# Patient Record
Sex: MALE | Race: BLACK OR AFRICAN AMERICAN
[De-identification: names, ages, dates, MRNs, and addresses within clinical notes are randomized per-mention and may not be internally consistent; named-entity substitution may affect disease eponyms.]

---

## 2019-08-03 ENCOUNTER — HOSPITAL ENCOUNTER (INPATIENT)
Dept: HOSPITAL 35 - ER | Age: 44
LOS: 5 days | Discharge: NURSING, REHAB OR LONG-TERM CARE | DRG: 871 | End: 2019-08-08
Attending: INTERNAL MEDICINE | Admitting: INTERNAL MEDICINE
Payer: COMMERCIAL

## 2019-08-03 VITALS — HEIGHT: 67.99 IN | BODY MASS INDEX: 26.19 KG/M2 | WEIGHT: 172.8 LBS

## 2019-08-03 VITALS — DIASTOLIC BLOOD PRESSURE: 89 MMHG | SYSTOLIC BLOOD PRESSURE: 134 MMHG

## 2019-08-03 VITALS — DIASTOLIC BLOOD PRESSURE: 91 MMHG | SYSTOLIC BLOOD PRESSURE: 132 MMHG

## 2019-08-03 VITALS — SYSTOLIC BLOOD PRESSURE: 150 MMHG | DIASTOLIC BLOOD PRESSURE: 74 MMHG

## 2019-08-03 VITALS — SYSTOLIC BLOOD PRESSURE: 138 MMHG | DIASTOLIC BLOOD PRESSURE: 97 MMHG

## 2019-08-03 DIAGNOSIS — G93.40: ICD-10-CM

## 2019-08-03 DIAGNOSIS — R73.9: ICD-10-CM

## 2019-08-03 DIAGNOSIS — K94.23: ICD-10-CM

## 2019-08-03 DIAGNOSIS — R40.3: ICD-10-CM

## 2019-08-03 DIAGNOSIS — D50.9: ICD-10-CM

## 2019-08-03 DIAGNOSIS — R25.2: ICD-10-CM

## 2019-08-03 DIAGNOSIS — J15.1: ICD-10-CM

## 2019-08-03 DIAGNOSIS — E87.6: ICD-10-CM

## 2019-08-03 DIAGNOSIS — A41.9: Primary | ICD-10-CM

## 2019-08-03 DIAGNOSIS — E43: ICD-10-CM

## 2019-08-03 DIAGNOSIS — G40.909: ICD-10-CM

## 2019-08-03 DIAGNOSIS — Z66: ICD-10-CM

## 2019-08-03 DIAGNOSIS — I10: ICD-10-CM

## 2019-08-03 DIAGNOSIS — Z86.711: ICD-10-CM

## 2019-08-03 DIAGNOSIS — Z99.11: ICD-10-CM

## 2019-08-03 DIAGNOSIS — N12: ICD-10-CM

## 2019-08-03 DIAGNOSIS — G03.9: ICD-10-CM

## 2019-08-03 DIAGNOSIS — M86.8X8: ICD-10-CM

## 2019-08-03 DIAGNOSIS — J96.11: ICD-10-CM

## 2019-08-03 DIAGNOSIS — R74.0: ICD-10-CM

## 2019-08-03 LAB
ALBUMIN SERPL-MCNC: 3.2 G/DL (ref 3.4–5)
ALT SERPL-CCNC: 158 U/L (ref 30–65)
ANION GAP SERPL CALC-SCNC: 9 MMOL/L (ref 7–16)
AST SERPL-CCNC: 40 U/L (ref 15–37)
BASOPHILS NFR BLD AUTO: 0.6 % (ref 0–2)
BILIRUB SERPL-MCNC: 0.8 MG/DL
BILIRUB UR-MCNC: NEGATIVE MG/DL
BUN SERPL-MCNC: 19 MG/DL (ref 7–18)
CALCIUM SERPL-MCNC: 10.4 MG/DL (ref 8.5–10.1)
CHLORIDE SERPL-SCNC: 101 MMOL/L (ref 98–107)
CO2 SERPL-SCNC: 31 MMOL/L (ref 21–32)
COLOR UR: YELLOW
CREAT SERPL-MCNC: 0.8 MG/DL (ref 0.7–1.3)
EOSINOPHIL NFR BLD: 1.4 % (ref 0–3)
ERYTHROCYTE [DISTWIDTH] IN BLOOD BY AUTOMATED COUNT: 17.3 % (ref 10.5–14.5)
GLUCOSE SERPL-MCNC: 115 MG/DL (ref 74–106)
GRANULOCYTES NFR BLD MANUAL: 73.1 % (ref 36–66)
HCT VFR BLD CALC: 37.3 % (ref 42–52)
HGB BLD-MCNC: 12.2 GM/DL (ref 14–18)
KETONES UR STRIP-MCNC: NEGATIVE MG/DL
LYMPHOCYTES NFR BLD AUTO: 14.7 % (ref 24–44)
MCH RBC QN AUTO: 25 PG (ref 26–34)
MCHC RBC AUTO-ENTMCNC: 32.7 G/DL (ref 28–37)
MCV RBC: 76.5 FL (ref 80–100)
MONOCYTES NFR BLD: 10.2 % (ref 1–8)
NEUTROPHILS # BLD: 12 THOU/UL (ref 1.4–8.2)
PLATELET # BLD: 356 THOU/UL (ref 150–400)
POTASSIUM SERPL-SCNC: 3.5 MMOL/L (ref 3.5–5.1)
PROT SERPL-MCNC: 8.8 G/DL (ref 6.4–8.2)
RBC # BLD AUTO: 4.88 MIL/UL (ref 4.5–6)
RBC # UR STRIP: NEGATIVE /UL
SODIUM SERPL-SCNC: 141 MMOL/L (ref 136–145)
SP GR UR STRIP: 1.02 (ref 1–1.03)
URINE CLARITY: CLEAR
URINE GLUCOSE-RANDOM*: NEGATIVE
URINE LEUKOCYTES-REFLEX: NEGATIVE
URINE NITRITE-REFLEX: NEGATIVE
URINE PROTEIN (DIPSTICK): NEGATIVE
UROBILINOGEN UR STRIP-ACNC: 0.2 E.U./DL (ref 0.2–1)
WBC # BLD AUTO: 16.4 THOU/UL (ref 4–11)

## 2019-08-03 PROCEDURE — 10879: CPT

## 2019-08-03 PROCEDURE — 27000 TENOTOMY ADDUCTOR HIP PERQ: CPT

## 2019-08-04 VITALS — DIASTOLIC BLOOD PRESSURE: 66 MMHG | SYSTOLIC BLOOD PRESSURE: 101 MMHG

## 2019-08-04 VITALS — SYSTOLIC BLOOD PRESSURE: 134 MMHG | DIASTOLIC BLOOD PRESSURE: 90 MMHG

## 2019-08-04 VITALS — SYSTOLIC BLOOD PRESSURE: 136 MMHG | DIASTOLIC BLOOD PRESSURE: 71 MMHG

## 2019-08-04 VITALS — DIASTOLIC BLOOD PRESSURE: 79 MMHG | SYSTOLIC BLOOD PRESSURE: 117 MMHG

## 2019-08-04 VITALS — SYSTOLIC BLOOD PRESSURE: 109 MMHG | DIASTOLIC BLOOD PRESSURE: 74 MMHG

## 2019-08-04 NOTE — NUR
pt opens his eyes, but pt no verbal, and pt does not follw commands, pt is on
o2 35% at trach, pt still have lot of sputum and needs suction, pt has low
fever today, temp 99.2-99.9 F (ax), pt has started IV abt and iv fluid, pt's
vs and o2sat are stable at this time.

## 2019-08-04 NOTE — NUR
Patient making slow progress towards outcome goals. Oxygenation optimal with
35% O2, sats 100%
Copious amount thick creamy/white to blood tinged secretions per
trach. Abdomen distended, hypoactive bowel sounds. Patient making frequent
gagging motion, no vomiting. Zofran given.

## 2019-08-05 VITALS — SYSTOLIC BLOOD PRESSURE: 125 MMHG | DIASTOLIC BLOOD PRESSURE: 87 MMHG

## 2019-08-05 VITALS — SYSTOLIC BLOOD PRESSURE: 135 MMHG | DIASTOLIC BLOOD PRESSURE: 93 MMHG

## 2019-08-05 VITALS — DIASTOLIC BLOOD PRESSURE: 96 MMHG | SYSTOLIC BLOOD PRESSURE: 134 MMHG

## 2019-08-05 VITALS — DIASTOLIC BLOOD PRESSURE: 87 MMHG | SYSTOLIC BLOOD PRESSURE: 128 MMHG

## 2019-08-05 VITALS — SYSTOLIC BLOOD PRESSURE: 121 MMHG | DIASTOLIC BLOOD PRESSURE: 91 MMHG

## 2019-08-05 VITALS — DIASTOLIC BLOOD PRESSURE: 76 MMHG | SYSTOLIC BLOOD PRESSURE: 102 MMHG

## 2019-08-05 LAB
ANION GAP SERPL CALC-SCNC: 8 MMOL/L (ref 7–16)
ANISOCYTOSIS BLD QL SMEAR: (no result)
BILIRUB UR-MCNC: NEGATIVE MG/DL
BUN SERPL-MCNC: 10 MG/DL (ref 7–18)
CALCIUM SERPL-MCNC: 9.7 MG/DL (ref 8.5–10.1)
CHLORIDE SERPL-SCNC: 108 MMOL/L (ref 98–107)
CO2 SERPL-SCNC: 27 MMOL/L (ref 21–32)
COLOR UR: YELLOW
CREAT SERPL-MCNC: 0.7 MG/DL (ref 0.7–1.3)
ERYTHROCYTE [DISTWIDTH] IN BLOOD BY AUTOMATED COUNT: 17.1 % (ref 10.5–14.5)
GLUCOSE SERPL-MCNC: 135 MG/DL (ref 74–106)
GRANULOCYTES NFR BLD MANUAL: 81 % (ref 36–66)
HCT VFR BLD CALC: 33 % (ref 42–52)
HGB BLD-MCNC: 10.6 GM/DL (ref 14–18)
KETONES UR STRIP-MCNC: NEGATIVE MG/DL
LYMPHOCYTES NFR BLD AUTO: 9 % (ref 24–44)
MCH RBC QN AUTO: 25 PG (ref 26–34)
MCHC RBC AUTO-ENTMCNC: 32 G/DL (ref 28–37)
MCV RBC: 77.9 FL (ref 80–100)
MONOCYTES NFR BLD: 6 % (ref 1–8)
NEUTROPHILS # BLD: 17.7 THOU/UL (ref 1.4–8.2)
NEUTS BAND NFR BLD: 4 % (ref 0–8)
NUCLEATED RBCS: 1 /100WBC
PLATELET # BLD EST: NORMAL 10*3/UL
PLATELET # BLD: 306 THOU/UL (ref 150–400)
POIKILOCYTOSIS BLD QL SMEAR: (no result)
POTASSIUM SERPL-SCNC: 4.2 MMOL/L (ref 3.5–5.1)
RBC # BLD AUTO: 4.24 MIL/UL (ref 4.5–6)
RBC # UR STRIP: NEGATIVE /UL
SODIUM SERPL-SCNC: 143 MMOL/L (ref 136–145)
SP GR UR STRIP: 1.01 (ref 1–1.03)
URINE CLARITY: CLEAR
URINE GLUCOSE-RANDOM*: NEGATIVE
URINE LEUKOCYTES-REFLEX: NEGATIVE
URINE NITRITE-REFLEX: NEGATIVE
URINE PROTEIN (DIPSTICK): NEGATIVE
UROBILINOGEN UR STRIP-ACNC: 0.2 E.U./DL (ref 0.2–1)
WBC # BLD AUTO: 20.8 THOU/UL (ref 4–11)

## 2019-08-05 NOTE — NUR
INITIAL ASSESSMENT:
Received consult due to pt being admitted from [facility]. MEJIA reviewed chart and
spoke with nursing. Pt was admitted from [facility] due to sepsis. Pt with hx
of seizure disorder and chronic respiratory failure/chronic vegetative state
due to viral meningitis. MEJIA spoke with pt's wife via phone. Introduced role of
MEJIA. Pt has been at [facility] for several weeks. Pt was at [facility] in
December of 2018, then he went to [facility], and then to [facility]. Pt's
wife is agreeable with plan for pt to return to [facility] when medically stable.
[facility] liaison was onsite today and confirms they are able to accept pt back
when he is medically stable. MEJIA is following to assist as needed with
discharge planning.

## 2019-08-05 NOTE — NUR
Nutrition: Recommend continuing Jevity 1.5 at current ordered rate of 50
ml/hr. Based on energy needs estimated ~1960 kcals (25 kcals/kg), recommend
goal rate of 50-55 ml/hr if running 24 hrs/day. Suggest 150 ml water flushes
q 4 hrs to meet minimum hydration needs. Elena Anne(Attending)

## 2019-08-06 VITALS — SYSTOLIC BLOOD PRESSURE: 141 MMHG | DIASTOLIC BLOOD PRESSURE: 84 MMHG

## 2019-08-06 VITALS — SYSTOLIC BLOOD PRESSURE: 132 MMHG | DIASTOLIC BLOOD PRESSURE: 92 MMHG

## 2019-08-06 VITALS — SYSTOLIC BLOOD PRESSURE: 119 MMHG | DIASTOLIC BLOOD PRESSURE: 84 MMHG

## 2019-08-06 VITALS — SYSTOLIC BLOOD PRESSURE: 135 MMHG | DIASTOLIC BLOOD PRESSURE: 98 MMHG

## 2019-08-06 LAB
ANION GAP SERPL CALC-SCNC: 11 MMOL/L (ref 7–16)
BUN SERPL-MCNC: 7 MG/DL (ref 7–18)
CALCIUM SERPL-MCNC: 9.4 MG/DL (ref 8.5–10.1)
CHLORIDE SERPL-SCNC: 107 MMOL/L (ref 98–107)
CO2 SERPL-SCNC: 25 MMOL/L (ref 21–32)
CREAT SERPL-MCNC: 0.8 MG/DL (ref 0.7–1.3)
ERYTHROCYTE [DISTWIDTH] IN BLOOD BY AUTOMATED COUNT: 17.1 % (ref 10.5–14.5)
GLUCOSE SERPL-MCNC: 138 MG/DL (ref 74–106)
HCT VFR BLD CALC: 33 % (ref 42–52)
HGB BLD-MCNC: 10.6 GM/DL (ref 14–18)
MCH RBC QN AUTO: 25 PG (ref 26–34)
MCHC RBC AUTO-ENTMCNC: 32.1 G/DL (ref 28–37)
MCV RBC: 77.8 FL (ref 80–100)
PLATELET # BLD: 308 THOU/UL (ref 150–400)
POTASSIUM SERPL-SCNC: 3.7 MMOL/L (ref 3.5–5.1)
RBC # BLD AUTO: 4.24 MIL/UL (ref 4.5–6)
SODIUM SERPL-SCNC: 143 MMOL/L (ref 136–145)
WBC # BLD AUTO: 14.3 THOU/UL (ref 4–11)

## 2019-08-06 PROCEDURE — 02HV33Z INSERTION OF INFUSION DEVICE INTO SUPERIOR VENA CAVA, PERCUTANEOUS APPROACH: ICD-10-PCS | Performed by: INTERNAL MEDICINE

## 2019-08-06 NOTE — NUR
ASSUMED PT CARE AROUND 1900. NONVERBAL. DOES NOT FOLLOW COMMANDS. NO APPARENT
PAIN. SUCTIONED LARGE AMOUNTS OF SECRETIONS FROM TRACH AND MOUTH. ORAL CARE
PROVIDED. PT SLEPT MOST OF THE NIGHT. Q2H TURN. EXTREMITIES ELEVATED ON
PILLOWS. TF VIA PEG TUBE - MINIMAL RESIDUALS. COMPLETE BED BATH GIVEN. FALL
PRECAUTIONS IN PLACE. PROGRESSING VERY SLOWLY TOWARD POC GOALS. WILL CONTINUE
TO MONITOR FURTHER.

## 2019-08-06 NOTE — NUR
SW reviewed chart and spoke with nursing and attending physician. Awaiting
cultures at this time. Discharge planner faxed updates to [facility] for review.
Plan is for pt to return to [facility] when medically stable. MEJIA is following
to assist as needed with discharge planning.

## 2019-08-06 NOTE — NUR
NOTICED THAT PEGTUBE IS PARIALY PROTRUDING FROM SITE. IT LEAKS TUBEFEEDING.
HOLD TUBEFEED FOR NOW. IR CONSULTED FOR REPLACEMNT IN THE MORNING. ALSO,
RECIEVED ORDER TO DECREACE SECRETIONS. HE HAS COPIOUS AMOUNTS OF SECRETIONS.
SPUTUM WHITE/CLEAR AND IT IS POOLING IN HIS MOUTH AND DRAINING AROUND SHOULDER
AND BACK

## 2019-08-06 NOTE — NUR
DISCHARGE PLANNING.  PATIENT ADMITTED FROM [facility] UNIT.  PLAN IS FOR
PATIENT TO RETURN TO [facility] UNIT ONCE MEDICALLY READY. ANA COSTELLO
INTAKE LIAISON AWARE.  CLINICAL INFORMATION FAXED TO [facility].  FOLLOWING TO
ASSIST WITH DISCHARGE NEEDS.

## 2019-08-06 NOTE — NUR
CONSULTED TO PLACE A PICC FOR A PATIENT NEEDING ADDITIONAL ACCESS FOR IV
ANTIBIOTICS. ORDER AND CONSENT NOTED. THE PATIENT IS NOT RESPONSIVE TO
TEACHING. THE RUE BASILIC WAS WIDLEY PATENT. A #5F DOUBLE LUMEN POWER PICC WAS
PLACED PER HOSPITAL POLICY AFTER A BEDSIDE TIMEOUT WAS COMPLETED. PICC WAS
TRIMMED TO 41CM AND ADVANCED WITHOUT DIFFICULTY. A STAT CHEST XRAY WAS ORDERED
FOR CONFIRMATION

## 2019-08-07 VITALS — SYSTOLIC BLOOD PRESSURE: 119 MMHG | DIASTOLIC BLOOD PRESSURE: 83 MMHG

## 2019-08-07 VITALS — DIASTOLIC BLOOD PRESSURE: 91 MMHG | SYSTOLIC BLOOD PRESSURE: 125 MMHG

## 2019-08-07 VITALS — SYSTOLIC BLOOD PRESSURE: 129 MMHG | DIASTOLIC BLOOD PRESSURE: 94 MMHG

## 2019-08-07 VITALS — DIASTOLIC BLOOD PRESSURE: 85 MMHG | SYSTOLIC BLOOD PRESSURE: 126 MMHG

## 2019-08-07 LAB
ALBUMIN SERPL-MCNC: 2.5 G/DL (ref 3.4–5)
ALT SERPL-CCNC: 96 U/L (ref 30–65)
ANION GAP SERPL CALC-SCNC: 8 MMOL/L (ref 7–16)
AST SERPL-CCNC: 25 U/L (ref 15–37)
BASOPHILS NFR BLD AUTO: 0.2 % (ref 0–2)
BE(VIVO): 2.2 MMOL/L
BILIRUB SERPL-MCNC: 0.5 MG/DL
BUN SERPL-MCNC: 5 MG/DL (ref 7–18)
CALCIUM SERPL-MCNC: 9.5 MG/DL (ref 8.5–10.1)
CHLORIDE SERPL-SCNC: 108 MMOL/L (ref 98–107)
CO2 SERPL-SCNC: 29 MMOL/L (ref 21–32)
CREAT SERPL-MCNC: 0.8 MG/DL (ref 0.7–1.3)
EOSINOPHIL NFR BLD: 2.5 % (ref 0–3)
ERYTHROCYTE [DISTWIDTH] IN BLOOD BY AUTOMATED COUNT: 17 % (ref 10.5–14.5)
GLUCOSE SERPL-MCNC: 94 MG/DL (ref 74–106)
GRANULOCYTES NFR BLD MANUAL: 76 % (ref 36–66)
HCO3 BLD-SCNC: 26.3 MMOL/L (ref 22–26)
HCT VFR BLD CALC: 29.6 % (ref 42–52)
HGB BLD-MCNC: 9.6 GM/DL (ref 14–18)
LYMPHOCYTES NFR BLD AUTO: 12.7 % (ref 24–44)
MCH RBC QN AUTO: 24.8 PG (ref 26–34)
MCHC RBC AUTO-ENTMCNC: 32.4 G/DL (ref 28–37)
MCV RBC: 76.6 FL (ref 80–100)
MONOCYTES NFR BLD: 8.6 % (ref 1–8)
NEUTROPHILS # BLD: 9.1 THOU/UL (ref 1.4–8.2)
PCO2 BLD: 38.7 MMHG (ref 35–45)
PLATELET # BLD: 325 THOU/UL (ref 150–400)
PO2 BLD: 197.4 MMHG (ref 80–100)
POTASSIUM SERPL-SCNC: 2.9 MMOL/L (ref 3.5–5.1)
PROT SERPL-MCNC: 6.9 G/DL (ref 6.4–8.2)
RBC # BLD AUTO: 3.86 MIL/UL (ref 4.5–6)
SODIUM SERPL-SCNC: 145 MMOL/L (ref 136–145)
WBC # BLD AUTO: 12 THOU/UL (ref 4–11)

## 2019-08-07 PROCEDURE — 0D20XUZ CHANGE FEEDING DEVICE IN UPPER INTESTINAL TRACT, EXTERNAL APPROACH: ICD-10-PCS | Performed by: RADIOLOGY

## 2019-08-07 NOTE — NUR
CONTINUES TO HAVE LARGE AMOUNTS OF SPUTUM. FREQUENT MOUTH SUCTIONING. TURNS 2
Q HOURS. NO SIGNS OF PAIN. ADEQAUTE URINE OUTPUT. CAREPLAN REVIEWED.

## 2019-08-07 NOTE — NUR
Recommend replacing K+ prior to restart of EN, when deemed safe again per
provider discretion given recent protrusion/leakage noted by nursing. Consider
check of mag, phos as well in case of any other lyte deficiencies. Goal rate
remains 50-55 ml/hr on Jevity 1.5 to meet 22-25 kcals/kg energy needs at [facility].

## 2019-08-07 NOTE — NUR
SW reviewed chart and spoke with nursing and attending physician. Pt is
scheduled to have peg tube replaced. SW updated [facility] liaison. Plan is for
pt to return to [facility] when medically stable. SW is following to assist as
needed with discharge planning.

## 2019-08-08 VITALS — SYSTOLIC BLOOD PRESSURE: 120 MMHG | DIASTOLIC BLOOD PRESSURE: 88 MMHG

## 2019-08-08 VITALS — SYSTOLIC BLOOD PRESSURE: 118 MMHG | DIASTOLIC BLOOD PRESSURE: 88 MMHG

## 2019-08-08 VITALS — SYSTOLIC BLOOD PRESSURE: 111 MMHG | DIASTOLIC BLOOD PRESSURE: 82 MMHG

## 2019-08-08 LAB
ANION GAP SERPL CALC-SCNC: 10 MMOL/L (ref 7–16)
BUN SERPL-MCNC: 6 MG/DL (ref 7–18)
CALCIUM SERPL-MCNC: 9.4 MG/DL (ref 8.5–10.1)
CHLORIDE SERPL-SCNC: 108 MMOL/L (ref 98–107)
CO2 SERPL-SCNC: 30 MMOL/L (ref 21–32)
CREAT SERPL-MCNC: 1 MG/DL (ref 0.7–1.3)
ERYTHROCYTE [DISTWIDTH] IN BLOOD BY AUTOMATED COUNT: 17.3 % (ref 10.5–14.5)
GLUCOSE SERPL-MCNC: 133 MG/DL (ref 74–106)
HCT VFR BLD CALC: 31.6 % (ref 42–52)
HGB BLD-MCNC: 10.3 GM/DL (ref 14–18)
MAGNESIUM SERPL-MCNC: 1.7 MG/DL (ref 1.8–2.4)
MCH RBC QN AUTO: 25.1 PG (ref 26–34)
MCHC RBC AUTO-ENTMCNC: 32.6 G/DL (ref 28–37)
MCV RBC: 77.1 FL (ref 80–100)
PLATELET # BLD: 354 THOU/UL (ref 150–400)
POTASSIUM SERPL-SCNC: 3 MMOL/L (ref 3.5–5.1)
RBC # BLD AUTO: 4.1 MIL/UL (ref 4.5–6)
SODIUM SERPL-SCNC: 148 MMOL/L (ref 136–145)
WBC # BLD AUTO: 12.2 THOU/UL (ref 4–11)

## 2019-08-08 NOTE — NUR
DISCHARGE NOTE:
SW reviewed chart and spoke with nursing and attending physician. Pt is
medically stable to return to [facility] today. SW notified [facility] liaison
of discharge, who confirmed they are able to accept pt today. Discharge
planner coordinated and notified family. [employer] to provide
transportation. Chart copy requested. Nursing to call report. No additional SW
needs identified at this time, but is available to assist should needs arise.

## 2019-08-08 NOTE — NUR
PATIENT DISCHARGED AT THIS TIME TO [facility]. HE IS STILL NO ABLE TO
FOLLOW SIMPLE INPUTS. DOES NOT SEEM TO BE IN PAIN AT THIS TIME. REPORT CALLED
TO FACILITY. WILL CONT WITH PLAN OF CARE.

## 2019-08-08 NOTE — NUR
INITIATED TUBE FEEDING AT 30ML/HR.  OVER NEXT 4 HOURS NO RESIDUAL.  INCREASED
TO 40ML/HR.  PT NOW HAS LOW LOSS AIR PUMP DUE TO LOW TJ SCORE.  ALSO PT
NOW HAS PRATHO BOOTS TO PROTECT THE HEELS.  REPOSITIONING PER POC.  FOLLOWING
POC FOR IVF AND IVPB.  AFTER LAB DRAW K+ IS 3.0, AND MG 1.7.  WBC ARE
DROPPING, CURRENT LEVEL IS 12.2.  PT REQUIRES FREQUENT SUCTIONING.  FALL
PRECAUTIONS IN PLACE.  VSS AND PT IS REMAINING AFEBRILE.  DR. MOYA PUT ORDER
FOR 40MG OF LASIX AT 2230.  PT HAD 2500 ML URINE OUT FOR SHIFT.  HOURLY
ROUNDING.

## 2021-10-11 NOTE — NUR
PATIENT HAD A NEW TUBE REPLACED TODAY AS THE OLD ONE WAS LEAKING. HE TOLERATED
PROCEDURE WELL. HE DOES NOT SEEM TO BE IN PAIN. TURNED Q2. KEPT CLEAN AND DRY.
WILL CONT WITH PLAN OF CARE. Patient was re-scheduled for cysto with Dr Georgia Whittaker from 11/16 to 11/29/2021 1/4/2022 cysto cancelled per patient request